# Patient Record
Sex: MALE | Race: WHITE | NOT HISPANIC OR LATINO | URBAN - METROPOLITAN AREA
[De-identification: names, ages, dates, MRNs, and addresses within clinical notes are randomized per-mention and may not be internally consistent; named-entity substitution may affect disease eponyms.]

---

## 2021-04-20 ENCOUNTER — OFFICE VISIT (OUTPATIENT)
Dept: PRIMARY CARE CLINIC | Facility: CLINIC | Age: 36
End: 2021-04-20
Payer: MEDICAID

## 2021-04-20 VITALS
HEART RATE: 77 BPM | WEIGHT: 162 LBS | BODY MASS INDEX: 20.79 KG/M2 | SYSTOLIC BLOOD PRESSURE: 122 MMHG | RESPIRATION RATE: 18 BRPM | OXYGEN SATURATION: 98 % | DIASTOLIC BLOOD PRESSURE: 72 MMHG | HEIGHT: 74 IN

## 2021-04-20 DIAGNOSIS — E78.00 HYPERCHOLESTEREMIA: ICD-10-CM

## 2021-04-20 DIAGNOSIS — Z00.00 ANNUAL PHYSICAL EXAM: ICD-10-CM

## 2021-04-20 DIAGNOSIS — E03.8 OTHER SPECIFIED HYPOTHYROIDISM: ICD-10-CM

## 2021-04-20 PROCEDURE — 99204 PR OFFICE/OUTPT VISIT, NEW, LEVL IV, 45-59 MIN: ICD-10-PCS | Mod: S$GLB,,, | Performed by: NURSE PRACTITIONER

## 2021-04-20 PROCEDURE — 99204 OFFICE O/P NEW MOD 45 MIN: CPT | Mod: S$GLB,,, | Performed by: NURSE PRACTITIONER

## 2021-04-21 LAB
CHOLEST SERPL-MCNC: 147 MG/DL
CHOLEST/HDLC SERPL: 3.4 (CALC)
HDLC SERPL-MCNC: 43 MG/DL
LDLC SERPL CALC-MCNC: 67 MG/DL (CALC)
NONHDLC SERPL-MCNC: 104 MG/DL (CALC)
T3FREE SERPL-MCNC: 3.2 PG/ML (ref 2.3–4.2)
T4 FREE SERPL-MCNC: 1 NG/DL (ref 0.8–1.8)
TRIGL SERPL-MCNC: 306 MG/DL
TSH SERPL-ACNC: 0.41 MIU/L (ref 0.4–4.5)

## 2021-07-16 ENCOUNTER — TELEPHONE (OUTPATIENT)
Dept: PRIMARY CARE CLINIC | Facility: CLINIC | Age: 36
End: 2021-07-16

## 2021-10-20 ENCOUNTER — OFFICE VISIT (OUTPATIENT)
Dept: PRIMARY CARE CLINIC | Facility: CLINIC | Age: 36
End: 2021-10-20
Payer: MEDICAID

## 2021-10-20 VITALS
RESPIRATION RATE: 18 BRPM | BODY MASS INDEX: 20.94 KG/M2 | HEART RATE: 81 BPM | WEIGHT: 163.19 LBS | SYSTOLIC BLOOD PRESSURE: 140 MMHG | HEIGHT: 74 IN | OXYGEN SATURATION: 98 % | DIASTOLIC BLOOD PRESSURE: 91 MMHG

## 2021-10-20 DIAGNOSIS — M25.531 RIGHT WRIST PAIN: ICD-10-CM

## 2021-10-20 DIAGNOSIS — M25.511 CHRONIC RIGHT SHOULDER PAIN: ICD-10-CM

## 2021-10-20 DIAGNOSIS — G89.29 CHRONIC RIGHT SHOULDER PAIN: ICD-10-CM

## 2021-10-20 DIAGNOSIS — L70.0 CYSTIC ACNE: Primary | ICD-10-CM

## 2021-10-20 PROCEDURE — 99214 PR OFFICE/OUTPT VISIT, EST, LEVL IV, 30-39 MIN: ICD-10-PCS | Mod: S$GLB,,, | Performed by: NURSE PRACTITIONER

## 2021-10-20 PROCEDURE — 99214 OFFICE O/P EST MOD 30 MIN: CPT | Mod: S$GLB,,, | Performed by: NURSE PRACTITIONER

## 2021-10-20 RX ORDER — DOXYCYCLINE HYCLATE 100 MG
100 TABLET ORAL 2 TIMES DAILY
Qty: 14 TABLET | Refills: 0 | Status: SHIPPED | OUTPATIENT
Start: 2021-10-20 | End: 2021-10-27

## 2021-10-20 RX ORDER — MELOXICAM 7.5 MG/1
7.5 TABLET ORAL 2 TIMES DAILY PRN
COMMUNITY
Start: 2021-05-20 | End: 2022-01-25

## 2021-10-26 ENCOUNTER — TELEPHONE (OUTPATIENT)
Dept: PRIMARY CARE CLINIC | Facility: CLINIC | Age: 36
End: 2021-10-26
Payer: MEDICAID

## 2022-01-24 ENCOUNTER — OFFICE VISIT (OUTPATIENT)
Dept: PRIMARY CARE CLINIC | Facility: CLINIC | Age: 37
End: 2022-01-24
Payer: MEDICAID

## 2022-01-24 VITALS
BODY MASS INDEX: 21.43 KG/M2 | HEIGHT: 74 IN | OXYGEN SATURATION: 100 % | RESPIRATION RATE: 18 BRPM | HEART RATE: 67 BPM | WEIGHT: 167 LBS | DIASTOLIC BLOOD PRESSURE: 77 MMHG | SYSTOLIC BLOOD PRESSURE: 125 MMHG

## 2022-01-24 DIAGNOSIS — Z00.00 ANNUAL PHYSICAL EXAM: ICD-10-CM

## 2022-01-24 DIAGNOSIS — E78.1 HIGH TRIGLYCERIDES: Primary | ICD-10-CM

## 2022-01-24 DIAGNOSIS — E03.8 OTHER SPECIFIED HYPOTHYROIDISM: ICD-10-CM

## 2022-01-24 PROCEDURE — 99214 OFFICE O/P EST MOD 30 MIN: CPT | Mod: S$GLB,,, | Performed by: NURSE PRACTITIONER

## 2022-01-24 PROCEDURE — 3008F BODY MASS INDEX DOCD: CPT | Mod: CPTII,S$GLB,, | Performed by: NURSE PRACTITIONER

## 2022-01-24 PROCEDURE — 1160F RVW MEDS BY RX/DR IN RCRD: CPT | Mod: CPTII,S$GLB,, | Performed by: NURSE PRACTITIONER

## 2022-01-24 PROCEDURE — 3074F PR MOST RECENT SYSTOLIC BLOOD PRESSURE < 130 MM HG: ICD-10-PCS | Mod: CPTII,S$GLB,, | Performed by: NURSE PRACTITIONER

## 2022-01-24 PROCEDURE — 3078F PR MOST RECENT DIASTOLIC BLOOD PRESSURE < 80 MM HG: ICD-10-PCS | Mod: CPTII,S$GLB,, | Performed by: NURSE PRACTITIONER

## 2022-01-24 PROCEDURE — 3078F DIAST BP <80 MM HG: CPT | Mod: CPTII,S$GLB,, | Performed by: NURSE PRACTITIONER

## 2022-01-24 PROCEDURE — 3008F PR BODY MASS INDEX (BMI) DOCUMENTED: ICD-10-PCS | Mod: CPTII,S$GLB,, | Performed by: NURSE PRACTITIONER

## 2022-01-24 PROCEDURE — 1159F MED LIST DOCD IN RCRD: CPT | Mod: CPTII,S$GLB,, | Performed by: NURSE PRACTITIONER

## 2022-01-24 PROCEDURE — 1160F PR REVIEW ALL MEDS BY PRESCRIBER/CLIN PHARMACIST DOCUMENTED: ICD-10-PCS | Mod: CPTII,S$GLB,, | Performed by: NURSE PRACTITIONER

## 2022-01-24 PROCEDURE — 99214 PR OFFICE/OUTPT VISIT, EST, LEVL IV, 30-39 MIN: ICD-10-PCS | Mod: S$GLB,,, | Performed by: NURSE PRACTITIONER

## 2022-01-24 PROCEDURE — 3074F SYST BP LT 130 MM HG: CPT | Mod: CPTII,S$GLB,, | Performed by: NURSE PRACTITIONER

## 2022-01-24 PROCEDURE — 1159F PR MEDICATION LIST DOCUMENTED IN MEDICAL RECORD: ICD-10-PCS | Mod: CPTII,S$GLB,, | Performed by: NURSE PRACTITIONER

## 2022-01-24 RX ORDER — HYDROCORTISONE 25 MG/G
CREAM TOPICAL
COMMUNITY
Start: 2021-12-30

## 2022-01-24 NOTE — PATIENT INSTRUCTIONS
"RTC in 3 months for F/U or sooner if needed.    Instructed patient to report to nearest ER or call 911 if begins to have difficulty breathing, turning blue, chest pain, B/P < 80/60 or >170/100, palpitations, syncope, extreme weakness, or severe H/A. Patient verbalized understanding.     Patient Education       Low Cholesterol, Saturated Fat, and Trans Fat Diet   About this topic   Cholesterol, saturated fat, and trans fat are in many foods. These may raise your blood cholesterol levels. If your cholesterol is too high, this can cause health problems in your heart, liver, kidneys, and even your eyes. The key to lowering your risk of heart problems is to lower your bad fat intake.  Saturated fats and trans fats are the bad fats. These fats clog your arteries and raise your bad cholesterol. Saturated fats and trans fats are solid fats at room temperature. Saturated fats are animal fats. Trans fats are manmade fats. They add flavor to a lot of packaged foods. Staying away from saturated and trans fats will help your heart.  When you do eat foods with fat, make sure they have the good fats. Monounsaturated and polyunsaturated fats are good fats. These fats help raise your good cholesterol and protect your heart.  General   How to Lower Fat and Cholesterol in Your Diet   · Read the labels of the foods you buy from the market to find out how much fat is present. Under 5% of total fat on a label means it is "low fat". Over 20% of total fat on a label means it is high fat.  · Eat high fiber foods, like soluble fiber. This type of fiber helps lower cholesterol in the body. Choose oatmeal, fruits (like apples), beans, and nuts to get the most soluble fiber.  · Eat foods high in omega-3 fatty acids like mariana seeds, walnuts, salmon, tuna, trout, herring, flaxseed, and soybeans. These foods help keep the heart healthy.  · Limit your bad fat and oil intake.  · Stay away from butter, stick margarine, shortening, lard, and palm " and coconut oil. Pick plant-based spreads instead.  · Limit mayonnaise, salad dressings, gravies, and sauces, unless it is made from low-fat ingredients.  · Limit chocolate.  · Do not eat high-fat processed foods like hot dogs, sherwood, sausage, ham and other luncheon meats high in fat, and some frozen foods. Pick fish, chicken, turkey, and lean meats instead.  · Eat more dried beans, lentils, and tofu to get your protein.  · Do not eat organ meats, like liver.  · Choose nonfat or low-fat milk, yogurt, and cheese.  · Use light or fat-free cream cheese and sour cream.  · Eat lots of fruits and vegetables.  · Pick whole grain breads, cereals, pastas, and rice.  · Do not eat snacks that are high in fats like granola, cookies, pies, pastries, doughnuts, and croissants.  · Stay away from deep fried foods.  Help When Cooking   · Remove the fat portion of meats and the skin from poultry before cooking.  · Bake, broil, grill, poach, or roast poultry, fish, and lean meats.  · Drain and throw away the fat that drains out of meat as you cook it.  · Try to add little or no fat to foods.  · Use olive or canola oil for cooking or baking.  · Steam your vegetables.  · Use herbs or no-oil marinades to flavor foods.         Who should use this diet?   This diet is for people who are at high risk of getting health problems like heart disease, high blood pressure, diabetes, and others. This diet is also good for all people to follow to keep your heart healthy.  What foods are good to eat?   Foods with good fats are:  · Canola, peanut, and olive oil  · Safflower, soybean, and corn oil  · Walnuts, almonds, cashews, and peanuts  · Pumpkin and sunflower seeds  · [place] and tuna  · Tofu  · Soymilk  · Avocado  What foods should be limited or avoided?   Stay away from these types of foods that have saturated fats:  · Whole fat dairy products like cheese, ice cream, whole milk, and cream  · Palm and coconut oils  · High-fat meats like beef,  lamb, poultry with the skin, sherwood, and sausage  · Butter and lard  Stay away from these types of foods that may have trans fat:  · Cookies, cakes, candy, doughnuts, baked goods, muffins, pizza dough, and pie crusts that are packaged  · Fried foods  · Frozen dinners  · Chips and crackers  · Microwave popcorn  · Stick margarine and vegetable shortenings  Helpful tips   To help stay away from saturated fat:  · Pick lean cuts of meat  · Take the skin off chicken and turkey or pick skinless  · Pick low-fat cheese, milk, and ice cream  · Use liquid oils when cooking and baking, such as olive oil and canola oil  To help stay away from trans fat:  · Look at your labels. Choose foods with 0% trans fat. Read the ingredient list. Avoid foods with partially hydrogenated oil in the ingredient list. This means there is trans fat in the product.  Where can I learn more?   American Heart Association   http://www.[email or web address]/HEARTORG/Conditions/Cholesterol/PreventionTreatmentofHighCholesterol/Know-Your-Fats_[facility]_305628_Article.jsp   Last Reviewed Date   2021-10-05  Consumer Information Use and Disclaimer   This information is not specific medical advice and does not replace information you receive from your health care provider. This is only a brief summary of general information. It does NOT include all information about conditions, illnesses, injuries, tests, procedures, treatments, therapies, discharge instructions or life-style choices that may apply to you. You must talk with your health care provider for complete information about your health and treatment options. This information should not be used to decide whether or not to accept your health care providers advice, instructions or recommendations. Only your health care provider has the knowledge and training to provide advice that is right for you.   Copyright   Copyright © 2021 [employer] and its affiliates and/or licensors. All rights reserved.

## 2022-01-24 NOTE — PROGRESS NOTES
Subjective:       Patient ID: Garret Cabral is a 36 y.o. male.    Chief Complaint: Follow-up    37 y/o male in for F/U.     Feels well with no new issues or concerns.    History of recurrent boils/cysts to his facial hairline. Was treated with doxycycline and they cleared up. Uses hydrocortisone cream when needed and it works well.     Does not take any regular medications.    Refuses COVID and FLU vaccines.    No other issues or concerns.            Past Medical History:   Diagnosis Date    Collapsed lung        History reviewed. No pertinent surgical history.    Family History   Problem Relation Age of Onset    Heart disease Mother     Heart disease Maternal Grandfather        Social History     Tobacco Use    Smoking status: Never Smoker    Smokeless tobacco: Never Used   Substance Use Topics    Alcohol use: Yes    Drug use: Never       Patient Active Problem List   Diagnosis    Recurrent boils       Immunization History   Administered Date(s) Administered    DTP 02/03/1987, 03/31/1987, 06/02/1987, 02/23/1988, 03/07/1991    HIB 03/07/1991    Hepatitis B, Pediatric/Adolescent 11/24/1998, 06/06/2002, 08/01/2002    MMR 02/23/1988, 03/07/1991    OPV 02/03/1987, 03/31/1987, 02/23/1988, 03/07/1991    Tdap 11/24/1998, 01/07/2018           Review of Systems   Constitutional: Negative for activity change, appetite change, chills, diaphoresis, fatigue and fever.   HENT: Negative for congestion, ear pain, tinnitus and trouble swallowing.    Eyes: Negative for pain and visual disturbance.   Respiratory: Negative for cough, chest tightness, shortness of breath and wheezing.    Cardiovascular: Negative for chest pain, palpitations and leg swelling.   Gastrointestinal: Negative for abdominal distention, abdominal pain and blood in stool.   Endocrine: Negative for cold intolerance, heat intolerance, polydipsia, polyphagia and polyuria.   Genitourinary: Negative for difficulty urinating, dysuria, frequency,  "hematuria and urgency.   Musculoskeletal: Negative for back pain, gait problem and neck pain.   Skin: Negative for color change and rash.   Neurological: Negative for dizziness, light-headedness and headaches.   Psychiatric/Behavioral: Negative for behavioral problems, confusion and dysphoric mood. The patient is not nervous/anxious.      Objective:     Vitals:    01/24/22 1618   BP: 125/77   BP Location: Left arm   Patient Position: Sitting   BP Method: Large (Automatic)   Pulse: 67   Resp: 18   SpO2: 100%   Weight: 75.8 kg (167 lb)   Height: 6' 2" (1.88 m)       Physical Exam  Vitals and nursing note reviewed.   Constitutional:       General: He is not in acute distress.     Appearance: Normal appearance. He is not diaphoretic.   HENT:      Head: Normocephalic and atraumatic.      Right Ear: Tympanic membrane normal.      Left Ear: Tympanic membrane normal.      Nose: Nose normal.      Mouth/Throat:      Mouth: Mucous membranes are moist.      Pharynx: Oropharynx is clear.   Eyes:      Extraocular Movements: Extraocular movements intact.      Conjunctiva/sclera: Conjunctivae normal.      Pupils: Pupils are equal, round, and reactive to light.   Neck:      Thyroid: No thyromegaly or thyroid tenderness.   Cardiovascular:      Rate and Rhythm: Normal rate and regular rhythm.      Pulses: Normal pulses.      Heart sounds: Normal heart sounds.   Pulmonary:      Effort: Pulmonary effort is normal.      Breath sounds: Normal breath sounds. No stridor. No wheezing or rales.   Abdominal:      General: Bowel sounds are normal. There is no distension.      Palpations: Abdomen is soft.      Tenderness: There is no abdominal tenderness.   Musculoskeletal:         General: No tenderness. Normal range of motion.      Cervical back: Normal range of motion and neck supple.      Right lower leg: No edema.      Left lower leg: No edema.   Lymphadenopathy:      Cervical: No cervical adenopathy.   Skin:     General: Skin is warm and " dry.      Capillary Refill: Capillary refill takes less than 2 seconds.   Neurological:      Mental Status: He is alert and oriented to person, place, and time.   Psychiatric:         Mood and Affect: Mood and affect normal.         Behavior: Behavior normal. Behavior is cooperative.         No visits with results within 6 Month(s) from this visit.   Latest known visit with results is:   Office Visit on 04/20/2021   Component Date Value Ref Range Status    Cholesterol 04/20/2021 147  <200 mg/dL Final    HDL 04/20/2021 43  > OR = 40 mg/dL Final    Triglycerides 04/20/2021 306* <150 mg/dL Final    LDL Cholesterol 04/20/2021 67  mg/dL (calc) Final    HDL/Cholesterol Ratio 04/20/2021 3.4  <5.0 (calc) Final    Non HDL Chol. (LDL+VLDL) 04/20/2021 104  <130 mg/dL (calc) Final    T4, Free 04/20/2021 1.0  0.8 - 1.8 ng/dL Final    TSH 04/20/2021 0.41  0.40 - 4.50 mIU/L Final    T3, Free 04/20/2021 3.2  2.3 - 4.2 pg/mL Final         Assessment:      1. High triglycerides    2. Annual physical exam    3. Other specified hypothyroidism          Plan:     High triglycerides  -     Lipid Panel; Future; Expected date: 01/26/2022    Annual physical exam  -     CBC Auto Differential; Future; Expected date: 01/26/2022  -     Comprehensive Metabolic Panel; Future; Expected date: 01/26/2022  -     Lipid Panel; Future; Expected date: 01/26/2022  -     TSH w/reflex to FT4; Future; Expected date: 01/26/2022    Other specified hypothyroidism  -     TSH w/reflex to FT4; Future; Expected date: 01/26/2022         Current Outpatient Medications   Medication Sig Dispense Refill    hydrocortisone 2.5 % cream APPLY TO RASH IN ARMPITS TWICE A DAY      meloxicam (MOBIC) 7.5 MG tablet Take 7.5 mg by mouth 2 (two) times daily as needed.      triamcinolone acetonide 0.1% (KENALOG) 0.1 % cream APPLY TO RASH TWICE A DAY. DO NOT USE ON FACE AXILLA OR GENITALS       No current facility-administered medications for this visit.       There are no  "discontinued medications.    Health Maintenance   Topic Date Due    Hepatitis C Screening  04/22/2022 (Originally 1986)    Lipid Panel  04/20/2026    TETANUS VACCINE  01/07/2028       Patient Instructions   RTC in 3 months for F/U or sooner if needed.    Instructed patient to report to nearest ER or call 911 if begins to have difficulty breathing, turning blue, chest pain, B/P < 80/60 or >170/100, palpitations, syncope, extreme weakness, or severe H/A. Patient verbalized understanding.     Patient Education       Low Cholesterol, Saturated Fat, and Trans Fat Diet   About this topic   Cholesterol, saturated fat, and trans fat are in many foods. These may raise your blood cholesterol levels. If your cholesterol is too high, this can cause health problems in your heart, liver, kidneys, and even your eyes. The key to lowering your risk of heart problems is to lower your bad fat intake.  Saturated fats and trans fats are the bad fats. These fats clog your arteries and raise your bad cholesterol. Saturated fats and trans fats are solid fats at room temperature. Saturated fats are animal fats. Trans fats are manmade fats. They add flavor to a lot of packaged foods. Staying away from saturated and trans fats will help your heart.  When you do eat foods with fat, make sure they have the good fats. Monounsaturated and polyunsaturated fats are good fats. These fats help raise your good cholesterol and protect your heart.  General   How to Lower Fat and Cholesterol in Your Diet   · Read the labels of the foods you buy from the market to find out how much fat is present. Under 5% of total fat on a label means it is "low fat". Over 20% of total fat on a label means it is high fat.  · Eat high fiber foods, like soluble fiber. This type of fiber helps lower cholesterol in the body. Choose oatmeal, fruits (like apples), beans, and nuts to get the most soluble fiber.  · Eat foods high in omega-3 fatty acids like mariana " seeds, walnuts, salmon, tuna, trout, herring, flaxseed, and soybeans. These foods help keep the heart healthy.  · Limit your bad fat and oil intake.  · Stay away from butter, stick margarine, shortening, lard, and palm and coconut oil. Pick plant-based spreads instead.  · Limit mayonnaise, salad dressings, gravies, and sauces, unless it is made from low-fat ingredients.  · Limit chocolate.  · Do not eat high-fat processed foods like hot dogs, sherwood, sausage, ham and other luncheon meats high in fat, and some frozen foods. Pick fish, chicken, turkey, and lean meats instead.  · Eat more dried beans, lentils, and tofu to get your protein.  · Do not eat organ meats, like liver.  · Choose nonfat or low-fat milk, yogurt, and cheese.  · Use light or fat-free cream cheese and sour cream.  · Eat lots of fruits and vegetables.  · Pick whole grain breads, cereals, pastas, and rice.  · Do not eat snacks that are high in fats like granola, cookies, pies, pastries, doughnuts, and croissants.  · Stay away from deep fried foods.  Help When Cooking   · Remove the fat portion of meats and the skin from poultry before cooking.  · Bake, broil, grill, poach, or roast poultry, fish, and lean meats.  · Drain and throw away the fat that drains out of meat as you cook it.  · Try to add little or no fat to foods.  · Use olive or canola oil for cooking or baking.  · Steam your vegetables.  · Use herbs or no-oil marinades to flavor foods.         Who should use this diet?   This diet is for people who are at high risk of getting health problems like heart disease, high blood pressure, diabetes, and others. This diet is also good for all people to follow to keep your heart healthy.  What foods are good to eat?   Foods with good fats are:  · Canola, peanut, and olive oil  · Safflower, soybean, and corn oil  · Walnuts, almonds, cashews, and peanuts  · Pumpkin and sunflower seeds  · [place] and tuna  · Tofu  · Soymilk  · Avocado  What foods should  be limited or avoided?   Stay away from these types of foods that have saturated fats:  · Whole fat dairy products like cheese, ice cream, whole milk, and cream  · Palm and coconut oils  · High-fat meats like beef, lamb, poultry with the skin, sherwood, and sausage  · Butter and lard  Stay away from these types of foods that may have trans fat:  · Cookies, cakes, candy, doughnuts, baked goods, muffins, pizza dough, and pie crusts that are packaged  · Fried foods  · Frozen dinners  · Chips and crackers  · Microwave popcorn  · Stick margarine and vegetable shortenings  Helpful tips   To help stay away from saturated fat:  · Pick lean cuts of meat  · Take the skin off chicken and turkey or pick skinless  · Pick low-fat cheese, milk, and ice cream  · Use liquid oils when cooking and baking, such as olive oil and canola oil  To help stay away from trans fat:  · Look at your labels. Choose foods with 0% trans fat. Read the ingredient list. Avoid foods with partially hydrogenated oil in the ingredient list. This means there is trans fat in the product.  Where can I learn more?   American Heart Association   http://www.heart.org/HEARTORG/Conditions/Cholesterol/PreventionTreatmentofHighCholesterol/Know-Your-Fats_[facility]_305628_Article.jsp   Last Reviewed Date   2021-10-05  Consumer Information Use and Disclaimer   This information is not specific medical advice and does not replace information you receive from your health care provider. This is only a brief summary of general information. It does NOT include all information about conditions, illnesses, injuries, tests, procedures, treatments, therapies, discharge instructions or life-style choices that may apply to you. You must talk with your health care provider for complete information about your health and treatment options. This information should not be used to decide whether or not to accept your health care providers advice, instructions or recommendations. Only your  health care provider has the knowledge and training to provide advice that is right for you.   Copyright   Copyright © 2021 UpToDate, Inc. and its affiliates and/or licensors. All rights reserved.          Risks, benefits, and alternatives discussed with patient, Patient verbalized understanding of discussed plan of care. Asked patient if any further questions, answered no.    Future Appointments   Date Time Provider Department Center   4/27/2022  2:40 PM Ashley Brown NP [facility]  Álvaro Brown NP

## 2022-01-25 PROBLEM — L02.92 RECURRENT BOILS: Status: ACTIVE | Noted: 2022-01-25

## 2022-01-25 RX ORDER — TRIAMCINOLONE ACETONIDE 1 MG/G
CREAM TOPICAL
COMMUNITY
Start: 2021-12-28

## 2022-11-09 ENCOUNTER — OFFICE VISIT (OUTPATIENT)
Dept: SURGERY | Facility: CLINIC | Age: 37
End: 2022-11-09
Payer: MEDICAID

## 2022-11-09 VITALS — WEIGHT: 159 LBS | BODY MASS INDEX: 20.41 KG/M2 | HEIGHT: 74 IN

## 2022-11-09 DIAGNOSIS — L02.01 FACIAL ABSCESS: Primary | ICD-10-CM

## 2022-11-09 PROCEDURE — 1160F RVW MEDS BY RX/DR IN RCRD: CPT | Mod: CPTII,S$GLB,, | Performed by: SURGERY

## 2022-11-09 PROCEDURE — 3008F BODY MASS INDEX DOCD: CPT | Mod: CPTII,S$GLB,, | Performed by: SURGERY

## 2022-11-09 PROCEDURE — 3008F PR BODY MASS INDEX (BMI) DOCUMENTED: ICD-10-PCS | Mod: CPTII,S$GLB,, | Performed by: SURGERY

## 2022-11-09 PROCEDURE — 1160F PR REVIEW ALL MEDS BY PRESCRIBER/CLIN PHARMACIST DOCUMENTED: ICD-10-PCS | Mod: CPTII,S$GLB,, | Performed by: SURGERY

## 2022-11-09 PROCEDURE — 99203 OFFICE O/P NEW LOW 30 MIN: CPT | Mod: S$GLB,,, | Performed by: SURGERY

## 2022-11-09 PROCEDURE — 1159F MED LIST DOCD IN RCRD: CPT | Mod: CPTII,S$GLB,, | Performed by: SURGERY

## 2022-11-09 PROCEDURE — 1159F PR MEDICATION LIST DOCUMENTED IN MEDICAL RECORD: ICD-10-PCS | Mod: CPTII,S$GLB,, | Performed by: SURGERY

## 2022-11-09 PROCEDURE — 99203 PR OFFICE/OUTPT VISIT, NEW, LEVL III, 30-44 MIN: ICD-10-PCS | Mod: S$GLB,,, | Performed by: SURGERY

## 2022-11-09 RX ORDER — CLINDAMYCIN HYDROCHLORIDE 300 MG/1
CAPSULE ORAL
COMMUNITY
Start: 2022-11-03

## 2022-11-09 RX ORDER — MUPIROCIN 20 MG/G
OINTMENT TOPICAL
COMMUNITY
Start: 2022-11-08

## 2022-11-09 NOTE — PROGRESS NOTES
Subjective:       Patient ID: Garret Cabral is a 36 y.o. male.    Chief Complaint: Abscess (Pt went to ER and was dx w/ R external ear abscess. Was put on oral and topical antibiotics. Area is still swollen and red but the swelling has gone done significantly.)      36-year-old male who presented the emergency room with a right-sided facial abscess near his right temple.  Was placed on p.o. antibiotics.  Patient states he saw an ENT surgeon yesterday who recommended continuing antibiotics and having excision of the infected cyst next week.  Patient states that since being on antibiotics symptoms have gotten significantly better.    Review of Systems   Constitutional:  Negative for chills, fatigue and fever.   HENT:  Negative for nasal congestion and rhinorrhea.    Respiratory:  Negative for shortness of breath and wheezing.    Cardiovascular:  Negative for chest pain and palpitations.   Gastrointestinal:  Negative for abdominal pain, blood in stool, diarrhea, nausea and vomiting.   Endocrine: Negative for cold intolerance and heat intolerance.   Genitourinary:  Negative for difficulty urinating.   Musculoskeletal:  Negative for joint swelling and myalgias.   Integumentary:  Negative for rash and wound.   Neurological:  Negative for weakness, light-headedness and numbness.   Psychiatric/Behavioral:  Negative for agitation and confusion.        Objective:      Physical Exam  Constitutional:       Appearance: Normal appearance. He is well-developed.   HENT:      Head: Normocephalic and atraumatic.        Comments: Right-sided facial abscess over his right temple     Nose: Nose normal.   Eyes:      General: Lids are normal.      Conjunctiva/sclera: Conjunctivae normal.   Neck:      Trachea: Trachea normal.   Cardiovascular:      Rate and Rhythm: Normal rate and regular rhythm.   Pulmonary:      Effort: Pulmonary effort is normal.      Breath sounds: Normal breath sounds.   Abdominal:      General: Bowel sounds are  normal. There is no distension.      Palpations: Abdomen is soft.      Tenderness: There is no abdominal tenderness.      Hernia: No hernia is present.   Musculoskeletal:      Cervical back: Normal range of motion.   Skin:     General: Skin is warm and dry.   Neurological:      Mental Status: He is alert and oriented to person, place, and time.   Psychiatric:         Speech: Speech normal.         Behavior: Behavior normal. Behavior is cooperative.       Assessment:       Problem List Items Addressed This Visit    None  Visit Diagnoses       Facial abscess    -  Primary              Plan:       36-year-old male with what is likely a infected supra auricle cyst or pit.  Patient is going to continue antibiotics follow up with ENT for excision.

## 2023-05-29 ENCOUNTER — PATIENT MESSAGE (OUTPATIENT)
Dept: ADMINISTRATIVE | Facility: HOSPITAL | Age: 38
End: 2023-05-29
Payer: MEDICAID

## 2023-09-25 ENCOUNTER — PATIENT MESSAGE (OUTPATIENT)
Dept: ADMINISTRATIVE | Facility: HOSPITAL | Age: 38
End: 2023-09-25
Payer: MEDICAID